# Patient Record
Sex: FEMALE | Race: WHITE | NOT HISPANIC OR LATINO | URBAN - NONMETROPOLITAN AREA
[De-identification: names, ages, dates, MRNs, and addresses within clinical notes are randomized per-mention and may not be internally consistent; named-entity substitution may affect disease eponyms.]

---

## 2018-10-18 PROBLEM — C71.9: Noted: 2018-10-18

## 2018-10-18 PROBLEM — H52.13: Noted: 2018-10-18

## 2018-10-18 PROBLEM — H52.223: Noted: 2018-10-18

## 2019-01-17 ENCOUNTER — IMPORTED ENCOUNTER (OUTPATIENT)
Dept: URBAN - NONMETROPOLITAN AREA CLINIC 1 | Facility: CLINIC | Age: 29
End: 2019-01-17

## 2019-01-17 PROCEDURE — 99213 OFFICE O/P EST LOW 20 MIN: CPT

## 2019-07-15 ENCOUNTER — IMPORTED ENCOUNTER (OUTPATIENT)
Dept: URBAN - NONMETROPOLITAN AREA CLINIC 1 | Facility: CLINIC | Age: 29
End: 2019-07-15

## 2019-07-15 PROBLEM — H52.223: Noted: 2019-07-15

## 2019-07-15 PROBLEM — C71.9: Noted: 2019-07-15

## 2019-07-15 PROBLEM — H53.453: Noted: 2019-07-15

## 2019-07-15 PROBLEM — H52.13: Noted: 2019-07-15

## 2019-07-15 PROCEDURE — 92310 CONTACT LENS FITTING OU: CPT

## 2019-07-15 PROCEDURE — 92015 DETERMINE REFRACTIVE STATE: CPT

## 2019-07-15 PROCEDURE — 92083 EXTENDED VISUAL FIELD XM: CPT

## 2019-07-15 PROCEDURE — 92014 COMPRE OPH EXAM EST PT 1/>: CPT

## 2019-07-15 NOTE — PATIENT DISCUSSION
Baseline Eval before Chemo tx for Brain tumorvf todaybaselineMyopia-Discussed diagnosis with patient. -Explained that people who are myopic are at a higher risk for developing RD/RT and reviewed associated S&S.-Pt to contact our office if symptoms develop. Astigmatism-Discussed diagnosis with patient. Updated spec Rx given. Recommend lens that will provide comfort as well as protect safety and health of eyes.

## 2021-07-12 ENCOUNTER — IMPORTED ENCOUNTER (OUTPATIENT)
Dept: URBAN - NONMETROPOLITAN AREA CLINIC 1 | Facility: CLINIC | Age: 31
End: 2021-07-12

## 2021-07-12 PROBLEM — H52.13: Noted: 2021-07-12

## 2021-07-12 PROBLEM — H52.223: Noted: 2021-07-12

## 2021-07-12 PROCEDURE — 92310 CONTACT LENS FITTING OU: CPT

## 2021-07-12 PROCEDURE — 92014 COMPRE OPH EXAM EST PT 1/>: CPT

## 2021-07-12 PROCEDURE — 92015 DETERMINE REFRACTIVE STATE: CPT

## 2021-07-12 NOTE — PATIENT DISCUSSION
Myopia-Discussed diagnosis with patient. -Explained that people who are myopic are at a higher risk for developing RD/RT and reviewed associated S&S.-Pt to contact our office if symptoms develop. Astigmatism-Discussed diagnosis with patient. Updated spec Rx given. Recommend lens that will provide comfort as well as protect safety and health of eyes. S/P BRAIN SURGERY

## 2022-04-15 ASSESSMENT — VISUAL ACUITY
OD_SC: 20/30-1
OS_CC: J1+
OD_SC: J1+
OS_SC: J1+
OS_CC: J1+
OD_SC: 20/30-1
OS_SC: 20/25
OS_SC: 20/30+2
OS_SC: 20/20
OD_CC: J1+
OD_CC: J1
OS_SC: J1+
OD_CC: J1+
OS_CC: J1
OD_SC: 20/20
OD_SC: 20/20
OS_SC: 20/30+2
OU_SC: 20/20
OD_SC: J1+

## 2022-04-15 ASSESSMENT — TONOMETRY
OS_IOP_MMHG: 17
OS_IOP_MMHG: 14
OD_IOP_MMHG: 14
OS_IOP_MMHG: 18
OD_IOP_MMHG: 16
OD_IOP_MMHG: 18